# Patient Record
Sex: FEMALE | URBAN - NONMETROPOLITAN AREA
[De-identification: names, ages, dates, MRNs, and addresses within clinical notes are randomized per-mention and may not be internally consistent; named-entity substitution may affect disease eponyms.]

---

## 2019-04-16 ENCOUNTER — TELEPHONE (OUTPATIENT)
Dept: SLEEP MEDICINE | Facility: HOSPITAL | Age: 59
End: 2019-04-16

## 2020-12-19 ENCOUNTER — HOSPITAL ENCOUNTER (EMERGENCY)
Age: 60
Discharge: HOME | End: 2020-12-19
Attending: EMERGENCY MEDICINE
Payer: MEDICARE

## 2020-12-19 ENCOUNTER — APPOINTMENT (OUTPATIENT)
Dept: GENERAL RADIOLOGY | Age: 60
End: 2020-12-19
Attending: EMERGENCY MEDICINE
Payer: MEDICARE

## 2020-12-19 VITALS
TEMPERATURE: 97.6 F | BODY MASS INDEX: 29.35 KG/M2 | RESPIRATION RATE: 14 BRPM | DIASTOLIC BLOOD PRESSURE: 70 MMHG | WEIGHT: 187 LBS | HEIGHT: 67 IN | HEART RATE: 77 BPM | SYSTOLIC BLOOD PRESSURE: 106 MMHG | OXYGEN SATURATION: 96 %

## 2020-12-19 DIAGNOSIS — M25.562 ACUTE PAIN OF BOTH KNEES: ICD-10-CM

## 2020-12-19 DIAGNOSIS — M25.561 ACUTE PAIN OF BOTH KNEES: ICD-10-CM

## 2020-12-19 DIAGNOSIS — M54.50 ACUTE LOW BACK PAIN, UNSPECIFIED BACK PAIN LATERALITY, UNSPECIFIED WHETHER SCIATICA PRESENT: Primary | ICD-10-CM

## 2020-12-19 PROCEDURE — 73562 X-RAY EXAM OF KNEE 3: CPT

## 2020-12-19 PROCEDURE — 74011250637 HC RX REV CODE- 250/637: Performed by: EMERGENCY MEDICINE

## 2020-12-19 PROCEDURE — 99283 EMERGENCY DEPT VISIT LOW MDM: CPT

## 2020-12-19 PROCEDURE — 72100 X-RAY EXAM L-S SPINE 2/3 VWS: CPT

## 2020-12-19 RX ORDER — MELOXICAM 15 MG/1
15 TABLET ORAL DAILY
Qty: 30 TAB | Refills: 0 | Status: SHIPPED | OUTPATIENT
Start: 2020-12-19

## 2020-12-19 RX ORDER — HYDROCODONE BITARTRATE AND ACETAMINOPHEN 5; 325 MG/1; MG/1
1 TABLET ORAL ONCE
Status: COMPLETED | OUTPATIENT
Start: 2020-12-19 | End: 2020-12-19

## 2020-12-19 RX ORDER — METHOCARBAMOL 500 MG/1
500 TABLET, FILM COATED ORAL
Qty: 16 TAB | Refills: 0 | Status: SHIPPED | OUTPATIENT
Start: 2020-12-19

## 2020-12-19 RX ADMIN — HYDROCODONE BITARTRATE AND ACETAMINOPHEN 1 TABLET: 5; 325 TABLET ORAL at 11:01

## 2020-12-19 NOTE — DISCHARGE INSTRUCTIONS
Take medications as directed    Follow with your primary care physician within a week for recheck if symptoms do not improve as you expect

## 2020-12-19 NOTE — ED TRIAGE NOTES
Pt ambulatory to triage. Pt states she fell yesterday in a hole. Pt states she has lower back pain since the fall. Pt states she landed on asphalt. Pt states also has bilateral knee pain. Pt is able to bear weight and walk. Pt states back pain radiates down both legs.

## 2020-12-19 NOTE — ED PROVIDER NOTES
[facility]       HPI:    72-year-old female presents to the ED with low back pain and bilateral knee pain. Patient had a trip and ground-level fall yesterday. She has been able to bear weight and ambulate. No LOC or head trauma. No neck or upper back pain. No focal weakness or paresthesias. No loss of control of bowel or bladder function. No relief with naproxen last night. Worse with ambulation. She does not think she has ever broken a bone in her back    REVIEW OF SYSTEMS     ROS Negative Except as Listed in HPI    PAST MEDICAL HISTORY     Past Medical History:   Diagnosis Date    Hypertension     Other ill-defined conditions     ovarian cyst ruptured on rt side    Psychiatric disorder     depression     Past Surgical History:   Procedure Laterality Date    HX HYSTERECTOMY      HX ORTHOPAEDIC      back surgery. R ROTATOR SURG    HX UROLOGICAL      blaDDER SLING     Social History     Socioeconomic History    Marital status: SINGLE     Spouse name: Not on file    Number of children: Not on file    Years of education: Not on file    Highest education level: Not on file   Tobacco Use    Smoking status: Current Every Day Smoker     Packs/day: 0.25   Substance and Sexual Activity    Alcohol use: No    Drug use: No    Sexual activity: Never     None     Allergies   Allergen Reactions    Other Medication Rash and Swelling     Unknown antibiotic        PHYSICAL EXAM       Vitals:    12/19/20 1037   BP: 106/70   Pulse: 77   Resp: 14   Temp: 97.6 °F (36.4 °C)   SpO2: 96%    Vital signs were reviewed. Physical Exam  Vitals signs and nursing note reviewed. Constitutional:       General: She is not in acute distress. Appearance: She is not ill-appearing, toxic-appearing or diaphoretic. HENT:      Head: Atraumatic. Mouth/Throat:      Mouth: Mucous membranes are moist.   Neck:      Comments: No midline or paraspinal tenderness.  Good range of motion. Cardiovascular:      Rate and Rhythm: Normal rate and regular rhythm. Pulmonary:      Effort: Pulmonary effort is normal.      Breath sounds: Normal breath sounds. Abdominal:      Palpations: Abdomen is soft. Tenderness: There is no abdominal tenderness. Musculoskeletal:      Comments: No midline thoracic or lumbar spine tenderness. She has diffuse paraspinal low back tenderness. Mild tenderness of the knees somewhat diffusely bilaterally but no palpable large joint effusions or bony deformities. Extensor mechanisms of the knee are intact. No other extremity tenderness, swelling, deformities noted. Skin:     General: Skin is warm and dry. Neurological:      Comments: Awake, alert. GCS 15. CN II-XII grossly intact. Speech clear. No gross lateralizing neuro deficits. Psychiatric:         Behavior: Behavior normal.          MEDICAL DECISION MAKING         I do not see evidence of fracture or other acute bony abnormality on my preliminary read of lumbar spine radiographs and bilateral knee radiographs. Medications   HYDROcodone-acetaminophen (NORCO) 5-325 mg per tablet 1 Tab (1 Tab Oral Given 12/19/20 1101)           Procedures        Disposition:    Discharge. Discussed results. Answered questions. Discussed expectant management supportive care    Diagnosis:     ICD-10-CM ICD-9-CM   1. Acute low back pain, unspecified back pain laterality, unspecified whether sciatica present  M54.5 724.2   2. Acute pain of both knees  M25.561 338.19    M25.562 719.46         __________________________________________________________      Please note, this chart was dictated using Dragon dictation, voice recognition software. While efforts were made to correct any transcription errors, some may inadvertently remain. Please forgive punctuation and typographic/voice recognition errors. Please contact me with any questions concerns or for clarification of documentation.

## 2023-12-20 NOTE — ED NOTES
I have reviewed discharge instructions with the patient. The patient verbalized understanding. Patient left ED via Discharge Method: ambulatory to Home with self. Opportunity for questions and clarification provided. Patient given 2 scripts. To continue your aftercare when you leave the hospital, you may receive an automated call from our care team to check in on how you are doing. This is a free service and part of our promise to provide the best care and service to meet your aftercare needs.  If you have questions, or wish to unsubscribe from this service please call [phone]. Thank you for Choosing our [facility] Emergency Department.
IMPROVE-DD Application Not Available

## 2024-09-23 ENCOUNTER — DASHBOARD ENCOUNTERS (OUTPATIENT)
Age: 64
End: 2024-09-23

## 2024-09-23 ENCOUNTER — OFFICE VISIT (OUTPATIENT)
Dept: URBAN - METROPOLITAN AREA CLINIC 107 | Facility: CLINIC | Age: 64
End: 2024-09-23

## 2024-09-23 RX ORDER — PANTOPRAZOLE SODIUM 40 MG/1
TABLET, DELAYED RELEASE ORAL
Qty: 30 TABLET | Status: ACTIVE | COMMUNITY

## 2024-09-23 RX ORDER — ROSUVASTATIN CALCIUM 20 MG/1
TAKE ONE TABLET BY MOUTH DAILY TABLET, FILM COATED ORAL
Qty: 90 EACH | Refills: 1 | Status: ACTIVE | COMMUNITY

## 2024-09-23 RX ORDER — ALPRAZOLAM 2 MG/1
TAKE ONE TABLET BY MOUTH TWICE DAILY FOR ANXIETY TABLET, EXTENDED RELEASE ORAL
Qty: 60 EACH | Refills: 1 | Status: ACTIVE | COMMUNITY

## 2024-09-23 RX ORDER — CLOPIDOGREL BISULFATE 75 MG/1
TABLET, FILM COATED ORAL
Qty: 30 TABLET | Status: ACTIVE | COMMUNITY

## 2024-09-23 NOTE — HPI-TODAY'S VISIT:
63 yo woman referred by Dr. Frank Mcmahan for acute gastritis. A copy of today's visit will be forwarded to the referring provider.  Referral notes are reviewed. She has had increased stress related to her family, child custody, etc. She has had a couple episodes of chest pain, with known fixed defect on nuclear medicine stress test. Insurance apparently denied cardiac catheterization, so a calcium cardiac CT was being arranged. She had used nitroglycerin for chest pain, and was also started on pantoprazole and ondansetron for GERD and nausea.

## 2024-10-01 ENCOUNTER — OFFICE VISIT (OUTPATIENT)
Dept: URBAN - METROPOLITAN AREA CLINIC 107 | Facility: CLINIC | Age: 64
End: 2024-10-01

## 2024-10-01 RX ORDER — ALPRAZOLAM 2 MG/1
TAKE ONE TABLET BY MOUTH TWICE DAILY FOR ANXIETY TABLET, EXTENDED RELEASE ORAL
Qty: 60 EACH | Refills: 1 | Status: ACTIVE | COMMUNITY

## 2024-10-01 RX ORDER — PANTOPRAZOLE SODIUM 40 MG/1
TABLET, DELAYED RELEASE ORAL
Qty: 30 TABLET | Status: ACTIVE | COMMUNITY

## 2024-10-01 RX ORDER — ROSUVASTATIN CALCIUM 20 MG/1
TAKE ONE TABLET BY MOUTH DAILY TABLET, FILM COATED ORAL
Qty: 90 EACH | Refills: 1 | Status: ACTIVE | COMMUNITY

## 2024-10-01 RX ORDER — CLOPIDOGREL BISULFATE 75 MG/1
TABLET, FILM COATED ORAL
Qty: 30 TABLET | Status: ACTIVE | COMMUNITY

## 2024-10-01 NOTE — HPI-TODAY'S VISIT:
63 yo woman referred by Dr. Frank Mcmahan for acute gastritis.[facility].  A copy of today's visit will be forwarded to the referring provider.  [facility] 8 years [facility]. [facility] [place].No prior EGD does have family history of gastric ulcers.   Referral notes are reviewed. She has had increased stress related to her family, child custody, etc. She has had a couple episodes of chest pain, with known fixed defect on nuclear medicine stress test. Insurance apparently denied cardiac catheterization, so a calcium cardiac CT was being arranged. She had used nitroglycerin for chest pain, and was also started on pantoprazole and ondansetron for GERD and nausea.

## 2024-10-02 ENCOUNTER — OFFICE VISIT (OUTPATIENT)
Dept: URBAN - METROPOLITAN AREA CLINIC 107 | Facility: CLINIC | Age: 64
End: 2024-10-02

## 2024-10-02 NOTE — HPI-TODAY'S VISIT:
63 yo woman with a medical history of hypertension, hyperlipidemia, vitamin D deficiency, tobacco dependence syndrome, depression. Referred by Dr. Frank Mcmahan for acute gastritis. [facility].  A copy of today's visit will be forwarded to the referring provider.   Referral notes are reviewed. She has had increased stress related to her family, child custody, etc. She has had a couple episodes of chest pain, with known fixed defect on nuclear medicine stress test. Insurance apparently denied cardiac catheterization, so a calcium cardiac CT was being arranged. She had used nitroglycerin for chest pain, and was also started on pantoprazole and ondansetron for GERD and nausea.   She has never had an EGD. Does have a significant family history of gastric ulcers   Last colonoscopy was 8+ years ago at [facility] had polyps removed does not know the number.